# Patient Record
Sex: FEMALE | Race: BLACK OR AFRICAN AMERICAN | NOT HISPANIC OR LATINO | URBAN - METROPOLITAN AREA
[De-identification: names, ages, dates, MRNs, and addresses within clinical notes are randomized per-mention and may not be internally consistent; named-entity substitution may affect disease eponyms.]

---
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
## 2020-02-03 ENCOUNTER — EMERGENCY (EMERGENCY)
Facility: HOSPITAL | Age: 20
LOS: 1 days | Discharge: HOME | End: 2020-02-03
Attending: EMERGENCY MEDICINE | Admitting: EMERGENCY MEDICINE
Payer: COMMERCIAL

## 2020-02-03 VITALS
SYSTOLIC BLOOD PRESSURE: 116 MMHG | RESPIRATION RATE: 18 BRPM | DIASTOLIC BLOOD PRESSURE: 79 MMHG | TEMPERATURE: 99 F | OXYGEN SATURATION: 100 % | HEART RATE: 93 BPM

## 2020-02-03 PROCEDURE — 99283 EMERGENCY DEPT VISIT LOW MDM: CPT

## 2020-02-03 NOTE — ED ADULT TRIAGE NOTE - CHIEF COMPLAINT QUOTE
fever and cough since Yesterday. Pt says she felt feverish and felt weak yesterday . Has not taken anything for fever. Pt appears to be comfortable. No PMH.

## 2020-02-04 VITALS
SYSTOLIC BLOOD PRESSURE: 94 MMHG | OXYGEN SATURATION: 100 % | TEMPERATURE: 99 F | RESPIRATION RATE: 16 BRPM | HEART RATE: 89 BPM | DIASTOLIC BLOOD PRESSURE: 63 MMHG

## 2020-02-04 PROBLEM — Z78.9 OTHER SPECIFIED HEALTH STATUS: Chronic | Status: ACTIVE | Noted: 2019-11-21

## 2020-02-04 RX ORDER — IBUPROFEN 200 MG
600 TABLET ORAL ONCE
Refills: 0 | Status: COMPLETED | OUTPATIENT
Start: 2020-02-04 | End: 2020-02-04

## 2020-02-04 RX ADMIN — Medication 600 MILLIGRAM(S): at 01:04

## 2020-02-04 NOTE — ED PROVIDER NOTE - ATTENDING CONTRIBUTION TO CARE
agree with resident note    "19 year-old female w/ no pertinent past medical history presents to the ED for URI symptoms.  Patient reports subjective fevers, HA and felt weak.  [place] and [place] concerned given the recent concerns about Coronavirus.  No recent travel.  No direct interaction with anyone infected with Coronavirus.  No cough, chest pain, shortness of breath, abdominal pain, diarrhea, midline neck stiffness.  Did not take any medications PTA."    PE: well appearing; VSS: CTAB/L; s1 s2 no m/r/g abd soft/NT/ND ext: no edema    Imp: viral syndrome

## 2020-02-04 NOTE — ED PROVIDER NOTE - PHYSICAL EXAMINATION
*Gen: NAD, AAO*3  *HEENT: NC/AT, MMM, airway patent, trachea midline  *CV: RRR, S1/S2 present, no murmurs/rubs  *Resp: no respiratory distress, LCTAB, no wheezing/rales  *Abd: non-distended, soft N/Tx4, no guarding or rigidity  *Neuro: no focal neuro deficits, moving all limbs appropriately, no midline TTP  *Extremities: no gross deformity  *Skin: no rashes, no wounds   ~ Sumaya Hatch M.D.

## 2020-02-04 NOTE — ED PROVIDER NOTE - OBJECTIVE STATEMENT
19 year-old female w/ no pertinent past medical history presents to the ED for URI symptoms.  Patient reports subjective fevers, HA and felt weak.  Patient [place] and being concerned given the recent concerns about Coronavirus.  No recent travel.  No direct interaction with anyone infected with Coronavirus.  No cough, chest pain, shortness of breath, abdominal pain, diarrhea, midline neck stiffness.  Did not take any medications PTA.

## 2020-02-04 NOTE — ED PROVIDER NOTE - CLINICAL SUMMARY MEDICAL DECISION MAKING FREE TEXT BOX
19 year-old female w/ no pertinent past medical history presents to the ED for URI symptoms likely viral infection.  No systemic signs in ER and patient appears well.  Very low suspicion of Coronavirus.  Plan for d/c home.  Motrin for paracervical pain - likely muscle spasm.

## 2020-02-04 NOTE — ED PROVIDER NOTE - NSFOLLOWUPINSTRUCTIONS_ED_ALL_ED_FT
You likely have a viral infection.  Recommend drinking plenty of fluids.  Can use Tylenol or Ibuprofen as per package directions for pain - use only as needed.  These are over-the-counter medications - please respect the warnings on the label.     Follow-up with your Primary Care Physician within 24-48 hours.  Please return to the Emergency Department immediately for any new, worsening or concerning symptoms.

## 2020-02-04 NOTE — ED PROVIDER NOTE - PATIENT PORTAL LINK FT
You can access the FollowMyHealth Patient Portal offered by [facility] by registering at the following website: [email or web address]. By joining [employer]’s FollowMyHealth portal, you will also be able to view your health information using other applications (apps) compatible with our system.

## 2021-02-18 NOTE — ED PROVIDER NOTE - CCCP TRG CHIEF CMPLNT
[General Appearance - Alert] : alert [General Appearance - In No Acute Distress] : in no acute distress [Sclera] : the sclera and conjunctiva were normal [PERRL With Normal Accommodation] : pupils were equal in size, round, and reactive to light [Outer Ear] : the ears and nose were normal in appearance [Neck Appearance] : the appearance of the neck was normal [Neck Cervical Mass (___cm)] : no neck mass was observed [Jugular Venous Distention Increased] : there was no jugular-venous distention [Skin Color & Pigmentation] : normal skin color and pigmentation [Skin Turgor] : normal skin turgor [] : no rash [Oriented To Time, Place, And Person] : oriented to person, place, and time [Impaired Insight] : insight and judgment were intact [Affect] : the affect was normal back pain

## 2022-09-24 ENCOUNTER — EMERGENCY (EMERGENCY)
Facility: HOSPITAL | Age: 22
LOS: 1 days | Discharge: HOME | End: 2022-09-24
Attending: EMERGENCY MEDICINE
Payer: COMMERCIAL

## 2022-09-24 VITALS
OXYGEN SATURATION: 98 % | HEIGHT: 67 IN | SYSTOLIC BLOOD PRESSURE: 114 MMHG | TEMPERATURE: 100 F | RESPIRATION RATE: 16 BRPM | DIASTOLIC BLOOD PRESSURE: 69 MMHG | HEART RATE: 99 BPM | WEIGHT: 175.93 LBS

## 2022-09-24 VITALS
RESPIRATION RATE: 20 BRPM | TEMPERATURE: 98 F | DIASTOLIC BLOOD PRESSURE: 76 MMHG | HEART RATE: 70 BPM | OXYGEN SATURATION: 99 % | SYSTOLIC BLOOD PRESSURE: 116 MMHG

## 2022-09-24 PROCEDURE — 99283 EMERGENCY DEPT VISIT LOW MDM: CPT | Mod: 25

## 2022-09-24 PROCEDURE — 73130 X-RAY EXAM OF HAND: CPT

## 2022-09-24 PROCEDURE — 90715 TDAP VACCINE 7 YRS/> IM: CPT

## 2022-09-24 PROCEDURE — 12002 RPR S/N/AX/GEN/TRNK2.6-7.5CM: CPT

## 2022-09-24 PROCEDURE — 90471 IMMUNIZATION ADMIN: CPT

## 2022-09-24 PROCEDURE — 73130 X-RAY EXAM OF HAND: CPT | Mod: 26,LT

## 2022-09-24 PROCEDURE — 99284 EMERGENCY DEPT VISIT MOD MDM: CPT | Mod: 25

## 2022-09-24 RX ORDER — IBUPROFEN 200 MG
600 TABLET ORAL ONCE
Refills: 0 | Status: COMPLETED | OUTPATIENT
Start: 2022-09-24 | End: 2022-09-24

## 2022-09-24 RX ORDER — ACETAMINOPHEN 500 MG
650 TABLET ORAL ONCE
Refills: 0 | Status: COMPLETED | OUTPATIENT
Start: 2022-09-24 | End: 2022-09-24

## 2022-09-24 RX ORDER — TETANUS TOXOID, REDUCED DIPHTHERIA TOXOID AND ACELLULAR PERTUSSIS VACCINE, ADSORBED 5; 2.5; 8; 8; 2.5 [IU]/.5ML; [IU]/.5ML; UG/.5ML; UG/.5ML; UG/.5ML
0.5 SUSPENSION INTRAMUSCULAR ONCE
Refills: 0 | Status: COMPLETED | OUTPATIENT
Start: 2022-09-24 | End: 2022-09-24

## 2022-09-24 RX ADMIN — TETANUS TOXOID, REDUCED DIPHTHERIA TOXOID AND ACELLULAR PERTUSSIS VACCINE, ADSORBED 0.5 MILLILITER(S): 5; 2.5; 8; 8; 2.5 SUSPENSION INTRAMUSCULAR at 17:04

## 2022-09-24 RX ADMIN — Medication 600 MILLIGRAM(S): at 17:08

## 2022-09-24 RX ADMIN — Medication 650 MILLIGRAM(S): at 17:04

## 2022-09-24 RX ADMIN — Medication 1 TABLET(S): at 17:04

## 2022-09-24 NOTE — ED PROVIDER NOTE - NSFOLLOWUPCLINICSTOKEN_GEN_ALL_ED_FT
[id number]: || ||00\01||False;[id number]: || ||00\01||False;[id number]: || ||00\01||False;[id number]: || ||00\01||False;

## 2022-09-24 NOTE — ED PROVIDER NOTE - PATIENT PORTAL LINK FT
You can access the FollowMyHealth Patient Portal offered by [facility] by registering at the following website: [email or web address]. By joining [employer]’s FollowMyHealth portal, you will also be able to view your health information using other applications (apps) compatible with our system.

## 2022-09-24 NOTE — ED PROVIDER NOTE - OBJECTIVE STATEMENT
Patient is a L-handed 23 y/o F with no sig PMHx who presents to the ED with a dog bite today. Was bit by her sister's [place] esparza on the L palm and dorsal forearm. L palm Patient is a L-handed 21 y/o F with no sig PMHx who presents to the ED with a dog bite today. Was bit by her sister's [place] esparza on the L palm and dorsal forearm. Dog has rabies vaccine per family. Last tetanus >10 yrs. No numbness, tingling.

## 2022-09-24 NOTE — ED PROVIDER NOTE - CARE PLAN
Principal Discharge DX:	Dog bite   1 Principal Discharge DX:	Dog bite  Secondary Diagnosis:	Injury of left hand

## 2022-09-24 NOTE — ED PROVIDER NOTE - ATTENDING CONTRIBUTION TO CARE
Attending note (Daljit): 23y/o F, L hand dominant, with no reported medical comorbidities, presenting with dog bite to L hand; today; dog is her sisters, known animal, has been vaccinated against rabies, and is otherwise behaving normally. No loss of sensation or movement of fingers, wrist. bleeding stopped.  On exam, laceration to palm and puncture wound to dorusm of hand, FROm of wrist and fingers; will obtain Xrays to eval for FB or fracture, then clean wound; consider loose primary closure with limited sutures as is gaping laceration in palm but concerning for infection high; offered tetanus vaccination and will dc with augmentin, f/u with hand surgery.

## 2022-09-24 NOTE — ED PROCEDURE NOTE - PROCEDURE ADDITIONAL DETAILS
Laceration caused by dog bite. Full closure not done due to risk of infection as this was caused by a dog bite. 2 loose sutures placed to approximate edges as wound is ~0.5cm deep. Done using Prolene 4.0.

## 2022-09-24 NOTE — ED ADULT NURSE NOTE - OBJECTIVE STATEMENT
22 yr old female with mother from home s/p dog bite to L hand and palm today, dog belongs to sister, not fully vaccinated, +no active bleeding noted to L hand, +small puncture wound noted, +band aide over hand, pt is due for tdap, no other c/o

## 2022-09-24 NOTE — ED ADULT NURSE NOTE - NSIMPLEMENTINTERV_GEN_ALL_ED
Implemented All Universal Safety Interventions:  [place] to call system. Call bell, personal items and telephone within reach. Instruct patient to call for assistance. Room bathroom lighting operational. Non-slip footwear when patient is off stretcher. Physically safe environment: no spills, clutter or unnecessary equipment. Stretcher in lowest position, wheels locked, appropriate side rails in place.

## 2022-09-24 NOTE — ED PROVIDER NOTE - ADDITIONAL NOTES AND INSTRUCTIONS:
Please excuse Paz Jean from work until the above date or as return is tolerated. She will require to wear gloves through her shift. She may be unable to perform certain work duties given the nature of her injury.     Cole Toussaint MD

## 2022-09-24 NOTE — ED PROVIDER NOTE - CLINICAL SUMMARY MEDICAL DECISION MAKING FREE TEXT BOX
23 y/o F p/w dog bite with laceration of palm and abrasion on dorsal wrist. Full rom, neurivascularly intact. Dog known, rabies vaccinated. Will put 2 loose sutures, abx, tetanus, hand f/u.

## 2022-09-24 NOTE — ED PROCEDURE NOTE - ATTENDING CONTRIBUTION TO CARE
I have participated in and supervised all key portions of the above procedures and agree with the above documentation. CAR Bocanegra MD

## 2022-09-24 NOTE — ED PROVIDER NOTE - NSFOLLOWUPINSTRUCTIONS_ED_ALL_ED_FT
FOLLOW ALL INSTRUCTIONS GIVEN YOU ABOUT THE CARE OF YOUR LACERATION    RETURN FOR SUTURE REMOVAL IN    7-10      DAYS    Sutures: 2     You received a Tetanus vaccine. Rabies vaccine was not indicated.     You will receive a call to make an appointment with a Hand Surgeon.     A prescription for Augmentin 875mg twice a day was sent to your pharmacy.     To control your pain at home, you should take Ibuprofen 400 mg along with Tylenol 650mg-1000mg every 6 to 8 hours. Limit your maximum daily Tylenol from all sources to 4000mg. Be aware that many other medications contain acetaminophen which is also known as Tylenol. Taking Tylenol and Ibuprofen together has been shown to be more effective at relieving pain than taking them separately. These are both over the counter medications that you can  at your local pharmacy without a prescription. You need to respect all of the warnings on the bottles. You shouldn’t take these medications for more than a week without following up with your doctor. Both medications come with certain risks and side effects that you need to discuss with your doctor, especially if you are taking them for a prolonged period.     Laceration WHAT YOU NEED TO KNOW:    A laceration is an injury to the skin and the soft tissue underneath it. Lacerations can happen anywhere on the body.    DISCHARGE INSTRUCTIONS:    Seek care immediately if:   •You have heavy bleeding or bleeding that does not stop after 10 minutes of holding firm, direct pressure over the wound.      •Your wound opens up.      Call your doctor if:   •You have a fever or chills.      •Your laceration is red, warm, or swollen.      •You have red streaks on your skin coming from your wound.      •You have white or yellow drainage from the wound that smells bad.      •You have pain that gets worse, even after treatment.      •You have questions or concerns about your condition or care.      Medicines: You may need any of the following:   •Prescription pain medicine may be given. Ask your healthcare provider how to take this medicine safely. Some prescription pain medicines contain acetaminophen. Do not take other medicines that contain acetaminophen without talking to your healthcare provider. Too much acetaminophen may cause liver damage. Prescription pain medicine may cause constipation. Ask your healthcare provider how to prevent or treat constipation.       •Antibiotics help treat or prevent a bacterial infection.      •Take your medicine as directed. Contact your healthcare provider if you think your medicine is not helping or if you have side effects. Tell him or her if you are allergic to any medicine. Keep a list of the medicines, vitamins, and herbs you take. Include the amounts, and when and why you take them. Bring the list or the pill bottles to follow-up visits. Carry your medicine list with you in case of an emergency.      Care for your wound as directed:   •Do not get your wound wet until your healthcare provider says it is okay. Do not soak your wound in water. Do not go swimming until your healthcare provider says it is okay. Carefully wash the wound with soap and water. Gently pat the area dry or allow it to air dry.      •Change your bandages when they get wet, dirty, or after washing. Apply new, clean bandages as directed. Do not apply elastic bandages or tape too tight. Do not put powders or lotions over your incision.      •Apply antibiotic ointment as directed. Your healthcare provider may give you antibiotic ointment to put over your wound if you have stitches. If you have strips of tape over your incision, let them dry up and fall off on their own. If they do not fall off within 14 days, gently remove them. If you have glue over your wound, do not remove or pick at it. If your glue comes off, do not replace it with glue that you have at home.        •Check your wound every day for signs of infection, such as swelling, redness, or pus.      Self-care:   •Apply ice on your wound for 15 to 20 minutes every hour or as directed. Use an ice pack, or put crushed ice in a plastic bag. Cover it with a towel. Ice helps prevent tissue damage and decreases swelling and pain.      •Use a splint as directed. A splint will decrease movement and stress on your wound. It may help it heal faster. A splint may be used for lacerations over joints or areas of your body that bend. Ask your healthcare provider how to apply and remove a splint.      •Decrease scarring of your wound by applying ointments as directed. Do not apply ointments until your healthcare provider says it is okay. You may need to wait until your wound is healed. Ask which ointment to buy and how often to use it. After your wound is healed, use sunscreen over the area when you are out in the sun. You should do this for at least 6 months to 1 year after your injury.      Follow up with your doctor as directed: You will need to return in 3 to 14 days to have stitches or staples removed. Write down your questions so you remember to ask them during your visits.

## 2022-09-24 NOTE — ED PROVIDER NOTE - NSFOLLOWUPCLINICS_GEN_ALL_ED_FT
[facility]  Plastic & Reconstructive Surgery  [address] [place] [address]  Phone: [phone]  Fax:     [facility] [place]  Plastic Surgery  [address], [address]  [place] [address]  Phone: [phone]  Fax:     [facility]  Hand  [address], [address]  [place] [address]  Phone: [phone]  Fax:     [facility]  Hand Surgery  [address]  [place] [address]  Phone: [phone]  Fax:

## 2022-09-24 NOTE — ED PROVIDER NOTE - PHYSICAL EXAMINATION
General: Alert and Orientated x 3. No apparent distress.  Head: Normocephalic and atraumatic.  Eyes: PERRLA with EOMI.  Neck: Supple. Trachea midline.   Cardiac: Normal S1 and S2 w/ RRR. No murmurs appreciated. No JVD appreciated.  Pulmonary: CTA bilaterally. No increased WOB. No wheezes or crackles.  Abdominal: Soft, non-tender. (+) bowel sounds appreciated in all 4 quadrants. No hepatosplenomegaly.   Neurologic: No focal sensory or motor deficits.  Musculoskeletal: LUE: Laceration on palmar surface of L hand. Measuring ~2.5cm in length. No FB. Small abrasion on dorsal wrist. Full ROM with some pain. Peripheral pulses intact. Sensation intact.   Skin: Color appropriate for race. Intact, warm, and well-perfused.  Psychiatric: Appropriate mood and affect. No apparent risk to self or others.

## 2025-03-31 NOTE — ED PROVIDER NOTE - NSCAREINITIATED _GEN_ER
I called the patient and informed her of what the doctor said below. She would like to cancel her appointment and receive the list of medications that she could try with her PCP.   
Precharting for Wednesday    Patient self-scheduled an appointment to discuss fibromyalgia further. Our department policy is that fibromyalgia patients are seen once and if there is no evidence for a rheumatologic condition, we no longer follow those patients, as fibromyalgia is not a rheumatologic condition and there is nothing to offer for it from a rheumatologic perspective    Please let patient know the following: unfortunately there is still nothing that I as a rheumatologist have to offer for fibromyalgia, as it is not a rheumatologic condition. PCP can consider medications such as duloxetine, pregabalin, gabapentin, and amitriptyline for fibromyalgia if clinically feasible and not contraindicated. Can also consider low-dose naltrexone or referral to a specialist who is able to manage this; this is not something managed by rheumatology either.    If she would like to keep the appointment that is fine but I unfortunately will not have anything additional to offer for this condition; I want to make sure that she has not made this appointment with the hope or expectation that I am going to be able to offer anything for fibromyalgia.  
Sumaya Hatch(Resident)